# Patient Record
Sex: FEMALE | Race: WHITE | NOT HISPANIC OR LATINO
[De-identification: names, ages, dates, MRNs, and addresses within clinical notes are randomized per-mention and may not be internally consistent; named-entity substitution may affect disease eponyms.]

---

## 2021-02-04 ENCOUNTER — RESULT REVIEW (OUTPATIENT)
Age: 46
End: 2021-02-04

## 2022-04-13 ENCOUNTER — APPOINTMENT (OUTPATIENT)
Dept: ORTHOPEDIC SURGERY | Facility: CLINIC | Age: 47
End: 2022-04-13
Payer: MEDICARE

## 2022-04-13 ENCOUNTER — NON-APPOINTMENT (OUTPATIENT)
Age: 47
End: 2022-04-13

## 2022-04-13 VITALS — BODY MASS INDEX: 28.32 KG/M2 | WEIGHT: 150 LBS | HEIGHT: 61 IN

## 2022-04-13 DIAGNOSIS — S42.292D OTHER DISPLACED FRACTURE OF UPPER END OF LEFT HUMERUS, SUBSEQUENT ENCOUNTER FOR FRACTURE WITH ROUTINE HEALING: ICD-10-CM

## 2022-04-13 PROCEDURE — 99024 POSTOP FOLLOW-UP VISIT: CPT

## 2022-04-13 PROCEDURE — 73030 X-RAY EXAM OF SHOULDER: CPT | Mod: LT

## 2022-04-15 NOTE — HISTORY OF PRESENT ILLNESS
[Left Arm] : left arm [4] : 4 [1] : 2 [Dull/Aching] : dull/aching [Intermittent] : intermittent [Rest] : rest [de-identified] : 4/13/22: Follow up L shoulder. She is in sling.\par \par 4/1/22: Here for follow up. She is in the sling. Her pain continues. She takes pain meds from pain management.\par \par 3/25/22: 47 y/o LHD female presenting s/p fall on 3/20/22 with left humeral fracture. She went to [facility] and xrays were taken, she was placed in a sling. She has pain and welling. Has been taking Percocet for pain. Takes extended release morphobond for pain from pain management.\par \par PMHx: subarachnoid hemorrhage, SLE on 4/5 mg prednisone

## 2022-04-15 NOTE — PHYSICAL EXAM
[Left] : left shoulder [The fracture is in acceptable alignment. There is progression in healing seen] : The fracture is in acceptable alignment. There is progression in healing seen [] : tenderness at lateral shoulder [de-identified] : not assessed due to fx

## 2022-04-15 NOTE — ASSESSMENT
[FreeTextEntry1] : Left proximal humerus, gr tub fx.\par Xrays reviewed. \par D/C Sling.\par Start PT for PROM, AROM. NWB.\par She requires home PT due to transportation issues.\par She takes pain meds from pain management, we discussed I would not send her in any narcotic pain meds. \par RTO 4 weeks with xrays.

## 2022-05-18 ENCOUNTER — APPOINTMENT (OUTPATIENT)
Dept: ORTHOPEDIC SURGERY | Facility: CLINIC | Age: 47
End: 2022-05-18
Payer: MEDICARE

## 2022-05-18 VITALS — BODY MASS INDEX: 28.32 KG/M2 | WEIGHT: 150 LBS | HEIGHT: 61 IN

## 2022-05-18 DIAGNOSIS — S42.252D DISPLACED FRACTURE OF GREATER TUBEROSITY OF LEFT HUMERUS, SUBSEQUENT ENCOUNTER FOR FRACTURE WITH ROUTINE HEALING: ICD-10-CM

## 2022-05-18 PROCEDURE — 73030 X-RAY EXAM OF SHOULDER: CPT | Mod: LT

## 2022-05-18 PROCEDURE — 99024 POSTOP FOLLOW-UP VISIT: CPT

## 2022-05-18 NOTE — PHYSICAL EXAM
[Left] : left shoulder [The fracture is in acceptable alignment. There is progression in healing seen] : The fracture is in acceptable alignment. There is progression in healing seen [] : strength is improving [FreeTextEntry9] : FE: 130P\par ER" 40P [de-identified] : not assessed due to fx

## 2022-05-18 NOTE — HISTORY OF PRESENT ILLNESS
[Left Arm] : left arm [4] : 4 [1] : 2 [Dull/Aching] : dull/aching [Intermittent] : intermittent [Rest] : rest [de-identified] : 5/18/222: Here for follow up.  She is working with a home PT and she is improving significantly \par \par 4/13/22: Follow up L shoulder. She is in sling.\par \par 4/1/22: Here for follow up. She is in the sling. Her pain continues. She takes pain meds from pain management.\par \par 3/25/22: 47 y/o LHD female presenting s/p fall on 3/20/22 with left humeral fracture. She went to [facility] and xrays were taken, she was placed in a sling. She has pain and welling. Has been taking Percocet for pain. Takes extended release morphobond for pain from pain management.\par \par PMHx: subarachnoid hemorrhage, SLE on 4/5 mg prednisone [FreeTextEntry1] : L shoulder [de-identified] : PT

## 2022-05-18 NOTE — ASSESSMENT
[FreeTextEntry1] : Left proximal humerus, gr tub fx.\par Xrays reviewed. \par PT for PROM, AROM. \par Strengthening as tolerated. \par RTO 6 weeks with xrays.

## 2022-07-06 ENCOUNTER — APPOINTMENT (OUTPATIENT)
Dept: ORTHOPEDIC SURGERY | Facility: CLINIC | Age: 47
End: 2022-07-06